# Patient Record
Sex: MALE | Race: WHITE | NOT HISPANIC OR LATINO | URBAN - METROPOLITAN AREA
[De-identification: names, ages, dates, MRNs, and addresses within clinical notes are randomized per-mention and may not be internally consistent; named-entity substitution may affect disease eponyms.]

---

## 2020-10-08 ENCOUNTER — TELEPHONE ENCOUNTER (OUTPATIENT)
Dept: URBAN - METROPOLITAN AREA CLINIC 35 | Facility: CLINIC | Age: 60
End: 2020-10-08

## 2020-10-12 ENCOUNTER — TELEPHONE ENCOUNTER (OUTPATIENT)
Dept: URBAN - METROPOLITAN AREA CLINIC 35 | Facility: CLINIC | Age: 60
End: 2020-10-12

## 2020-10-13 ENCOUNTER — OFFICE VISIT (OUTPATIENT)
Dept: URBAN - METROPOLITAN AREA CLINIC 33 | Facility: CLINIC | Age: 60
End: 2020-10-13

## 2020-10-13 VITALS
SYSTOLIC BLOOD PRESSURE: 130 MMHG | WEIGHT: 216 LBS | DIASTOLIC BLOOD PRESSURE: 90 MMHG | HEIGHT: 68 IN | BODY MASS INDEX: 32.74 KG/M2

## 2020-10-13 RX ORDER — OMEPRAZOLE 20 MG/1
1 CAPSULE CAPSULE, DELAYED RELEASE ORAL
Status: DISCONTINUED | COMMUNITY
Start: 2015-06-17

## 2020-10-13 RX ORDER — SODIUM SULFATE, POTASSIUM SULFATE, MAGNESIUM SULFATE 17.5; 3.13; 1.6 G/ML; G/ML; G/ML
AS DIRECTED SOLUTION, CONCENTRATE ORAL
Qty: 1 | Refills: 0 | OUTPATIENT
Start: 2020-10-13

## 2020-10-13 RX ORDER — RIBAVIRIN 600 MG/1
AS DIRECTED TABLET, FILM COATED ORAL TWICE A DAY
Qty: 168 | Status: ON HOLD | COMMUNITY
Start: 2014-09-26

## 2020-10-13 RX ORDER — FAMOTIDINE 20 MG
1 CAPSULE TABLET ORAL ONCE A DAY
COMMUNITY

## 2020-10-13 RX ORDER — PNV NO.95/FERROUS FUM/FOLIC AC 28MG-0.8MG
TABLET ORAL
COMMUNITY

## 2020-10-13 RX ORDER — NAPROXEN SODIUM 220 MG/1
CAPSULE, LIQUID FILLED ORAL
Status: ON HOLD | COMMUNITY
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
## 2020-10-16 LAB
A/G RATIO: 1.8
ALBUMIN, SERUM: 4.1
ALKALINE PHOSPHATASE, S: 50
ALT (SGPT): 18
AST (SGOT): 20
BASO (ABSOLUTE): 0.2
BASOS: 2
BILIRUBIN, TOTAL: 0.4
BUN/CREATININE RATIO: 12
BUN: 15
CALCIUM, SERUM: 9.3
CARBON DIOXIDE, TOTAL: 23
CHLORIDE, SERUM: 103
CREATININE, SERUM: 1.26
EGFR IF AFRICN AM: 72
EGFR IF NONAFRICN AM: 62
EOS (ABSOLUTE): 0.3
EOS: 4
GLOBULIN, TOTAL: 2.3
GLUCOSE, SERUM: 106
HEMATOCRIT: 41.6
HEMATOLOGY COMMENTS:: (no result)
HEMOGLOBIN: 15.3
IMMATURE CELLS: (no result)
IMMATURE GRANS (ABS): (no result)
IMMATURE GRANULOCYTES: (no result)
LYMPHS (ABSOLUTE): 1.6
LYMPHS: 19
MCH: 33.7
MCHC: 36.8
MCV: 92
MONOCYTES(ABSOLUTE): 0.9
MONOCYTES: 11
NEUTROPHILS (ABSOLUTE): 5.4
NEUTROPHILS: 64
NRBC: (no result)
PLATELETS: 169
POTASSIUM, SERUM: 4.4
PROTEIN, TOTAL, SERUM: 6.4
RBC: 4.54
RDW: 13.1
SODIUM, SERUM: 139
WBC: 8.5

## 2020-10-20 ENCOUNTER — TELEPHONE ENCOUNTER (OUTPATIENT)
Dept: URBAN - METROPOLITAN AREA CLINIC 35 | Facility: CLINIC | Age: 60
End: 2020-10-20

## 2020-11-04 ENCOUNTER — OFFICE VISIT (OUTPATIENT)
Dept: URBAN - METROPOLITAN AREA SURGERY CENTER 8 | Facility: SURGERY CENTER | Age: 60
End: 2020-11-04

## 2020-11-16 ENCOUNTER — TELEPHONE ENCOUNTER (OUTPATIENT)
Dept: URBAN - METROPOLITAN AREA CLINIC 35 | Facility: CLINIC | Age: 60
End: 2020-11-16

## 2020-11-17 ENCOUNTER — TELEPHONE ENCOUNTER (OUTPATIENT)
Dept: URBAN - METROPOLITAN AREA CLINIC 35 | Facility: CLINIC | Age: 60
End: 2020-11-17

## 2020-11-17 ENCOUNTER — OFFICE VISIT (OUTPATIENT)
Dept: URBAN - METROPOLITAN AREA CLINIC 33 | Facility: CLINIC | Age: 60
End: 2020-11-17

## 2020-11-25 ENCOUNTER — TELEPHONE ENCOUNTER (OUTPATIENT)
Dept: URBAN - METROPOLITAN AREA CLINIC 35 | Facility: CLINIC | Age: 60
End: 2020-11-25